# Patient Record
Sex: FEMALE | Race: WHITE | Employment: PART TIME | URBAN - METROPOLITAN AREA
[De-identification: names, ages, dates, MRNs, and addresses within clinical notes are randomized per-mention and may not be internally consistent; named-entity substitution may affect disease eponyms.]

---

## 2017-02-03 ENCOUNTER — OFFICE VISIT (OUTPATIENT)
Dept: FAMILY MEDICINE | Facility: CLINIC | Age: 52
End: 2017-02-03
Payer: COMMERCIAL

## 2017-02-03 ENCOUNTER — OFFICE VISIT (OUTPATIENT)
Dept: FAMILY MEDICINE | Facility: CLINIC | Age: 52
End: 2017-02-03
Payer: OTHER MISCELLANEOUS

## 2017-02-03 VITALS
TEMPERATURE: 97.8 F | HEIGHT: 63 IN | DIASTOLIC BLOOD PRESSURE: 56 MMHG | HEART RATE: 56 BPM | SYSTOLIC BLOOD PRESSURE: 115 MMHG | BODY MASS INDEX: 21.26 KG/M2 | WEIGHT: 120 LBS

## 2017-02-03 VITALS
SYSTOLIC BLOOD PRESSURE: 115 MMHG | TEMPERATURE: 97.8 F | DIASTOLIC BLOOD PRESSURE: 56 MMHG | HEIGHT: 63 IN | HEART RATE: 56 BPM | WEIGHT: 120.1 LBS | BODY MASS INDEX: 21.28 KG/M2

## 2017-02-03 DIAGNOSIS — R51.9 PERSISTENT HEADACHES: Primary | ICD-10-CM

## 2017-02-03 DIAGNOSIS — S06.0X0S CONCUSSION, WITHOUT LOSS OF CONSCIOUSNESS, SEQUELA: Primary | ICD-10-CM

## 2017-02-03 DIAGNOSIS — R51.9 CHRONIC DAILY HEADACHE: ICD-10-CM

## 2017-02-03 DIAGNOSIS — M26.609 TMJ (TEMPOROMANDIBULAR JOINT SYNDROME): ICD-10-CM

## 2017-02-03 PROCEDURE — 99213 OFFICE O/P EST LOW 20 MIN: CPT | Performed by: PHYSICIAN ASSISTANT

## 2017-02-03 NOTE — MR AVS SNAPSHOT
After Visit Summary   2/3/2017    Opal Dupree    MRN: [id number]           Patient Information     Date Of Birth          1965        Visit Information        Provider Department      2/3/2017 2:00 PM Aubree Coates PA-C [facility]        Today's Diagnoses     Persistent headaches    -  1     Chronic daily headache           Care Instructions    Follow up for physical  For headaches: neck stretches, heat/ice  - make appointment to see neuro  - MRI/MRA - For [place] imaging department: call [phone] to schedule  - we'll let you know about labs                               Tension Headaches  Tension headaches cause a dull, steady pain on both sides of the head and in the neck and the back of the head. The eyes may also feel tired. Tension headaches can be triggered by lack of sleep, poor posture, eyestrain, stress, and other factors.          To help prevent tension headaches:    Make sure your work area is properly set up to help you avoid neck strain and eyestrain.    Make sure that your eyeglass prescription is current and is appropriate for the work you do.    Learn techniques for relaxing and reducing emotional stress. These include deep breathing, progressive relaxation, and biofeedback.    Maintain a regular exercise regimen under the guidance of a doctor. This can help keep your neck and back flexible, strong, and relaxed.  To relieve the pain:    Use moist heat to relax the muscles. Soak in a hot bath or wrap a warm, moist towel around your neck.    Brush your scalp lightly with a soft hairbrush.    Give yourself a massage. Knead the muscles running from your shoulders up the back of your skull.    Use an ice pack. Apply this directly to the place where you feel pain.    Rest. Sleeping often helps relieve headache pain.    Drink plenty of fluids. Dehydration is another trigger for headaches.    Neck Tension Rehabilitation Exercises   You may do all of these exercises  right away but avoid any movements that increase your pain.     Neck rotation with flexion:   Right: Turn your head to the right and clasp your hands behind your head. Let the weight of your arms pull your chin to the right side of your chest. Relax. Hold for a count of 15. Do this 3 times.   Left: Turn your head to the left and clasp your hands behind your head. Let the weight of your arms pull your chin to the left side of your chest. Relax. Hold for a count of 15. Do this 3 times.     Chin tuck: Place your fingertips on your chin and gently push your head straight back as if you are trying to make a double chin. Keep looking forward as your head moves back. Hold 5 seconds and repeat 5 times.     Scalene stretch: This stretches the neck muscles that attach to your ribs. Sitting in an upright position, clasp both hands behind your back, lower your left shoulder, and tilt your head toward the right. Hold this position for 15 to 30 seconds and then come back to the starting position. Lower your right shoulder and tilt your head toward the left until you feel a stretch. Hold for 15 to 30 seconds. Repeat 3 times on each side.     Neck rotation stretch   Right side: Rotate your neck by looking over your right shoulder. Lift your right hand and place your palm on the left side of your chin. Push your chin with your palm toward your right shoulder. Hold for a count of 10. Do this 3 times.   Left side: Rotate your neck by looking over your left shoulder. Lift your left hand and place your palm on the right side of your chin. Push your chin with your palm toward your left shoulder. Hold for a count of 10. Do this 3 times.     Scapular squeeze: While sitting or standing with your arms by your sides, squeeze your shoulder blades together and hold for 5 seconds. Do 3 sets of 10.     Thoracic extension: While sitting in a chair, clasp both arms behind your head. Gently arch backward and look up toward the ceiling. Repeat 10  times. Do this several times per day.                               Follow-ups after your visit        Additional Services     NEUROLOGY ADULT REFERRAL       Your provider has referred you to: FMG: [place] [facility] [phone]   [email or web address]  FMG: [place] [facility] [phone]   [email or web address]  UMP: [facility] [phone]   [email or web address]  FHN: [facility] [phone]   [email or web address]    Reason for Referral: Consult    Please be aware that coverage of these services is subject to the terms and limitations of your health insurance plan.  Call member services at your health plan with any benefit or coverage questions.      Please bring the following with you to your appointment:    (1) Any X-Rays, CTs or MRIs which have been performed.  Contact the facility where they were done to arrange for  prior to your scheduled appointment.    (2) List of current medications  (3) This referral request   (4) Any documents/labs given to you for this referral                  Future tests that were ordered for you today     Open Future Orders        Priority Expected Expires Ordered    MR Brain w/o & w Contrast Routine  2/3/2018 2/3/2017    MRA Brain Venogram w&wo Contrast Routine  2/3/2018 2/3/2017            Who to contact     Normal or non-critical lab and imaging results will be communicated to you by MyChart, letter or phone within 4 business days after the clinic has received the results. If you do not hear from us within 7 days, please contact the clinic through [employer] or phone. If you have a critical or abnormal lab result, we will notify you by phone as soon as possible.  Submit refill requests through Ztory or call your pharmacy and they will forward the refill request to us. Please  "allow 3 business days for your refill to be completed.          If you need to speak with a  for additional information , please call: [phone]             Additional Information About Your Visit        MyChart Information     Trineanhart lets you send messages to your doctor, view your test results, renew your prescriptions, schedule appointments and more. To sign up, go to [email or web address] . Click on \"Log in\" on the left side of the screen, which will take you to the Welcome page. Then click on \"Sign up Now\" on the right side of the page.     You will be asked to enter the access code listed below, as well as some personal information. Please follow the directions to create your username and password.     Your access code is: [email or web address]  Expires: 3/19/2017  2:30 PM     Your access code will  in 90 days. If you need help or a new code, please call your [place] clinic or [phone].        Care EveryWhere ID     This is your Care EveryWhere ID. This could be used by other organizations to access your [place] medical records  [id number]        Your Vitals Were     Last Period                   2011            Blood Pressure from Last 3 Encounters:   17 115/56   16 117/71   16 112/74    Weight from Last 3 Encounters:   17 120 lb 1.6 oz (54.477 kg)   16 123 lb 8 oz (56.019 kg)   16 124 lb 9.6 oz (56.518 kg)              We Performed the Following     CBC with platelets differential     Comprehensive metabolic panel     NEUROLOGY ADULT REFERRAL     TSH with free T4 reflex        Primary Care Provider Office Phone # Fax #    KEITH Batista [phone]       [place] [facility] [address] [place] [address]        Thank you!     Thank you for choosing [facility]  for your care. Our goal is always to provide you with excellent care. Hearing back from our patients is one way we can continue " to improve our services. Please take a few minutes to complete the written survey that you may receive in the mail after your visit with us. Thank you!             Your Updated Medication List - Protect others around you: Learn how to safely use, store and throw away your medicines at www.disposemymeds.org.      Notice  As of 2/3/2017  3:30 PM    You have not been prescribed any medications.

## 2017-02-03 NOTE — NURSING NOTE
"Chief Complaint   Patient presents with     WC     Follow up from WC 12/19/2016     Headache     Still having headaches       Initial /56 mmHg  Pulse 56  Temp(Src) 97.8  F (36.6  C) (Tympanic)  Ht 5' 2.5\" (1.588 m)  Wt 120 lb 1.6 oz (54.477 kg)  BMI 21.60 kg/m2  LMP 01/14/2011 Estimated body mass index is 21.6 kg/(m^2) as calculated from the following:    Height as of this encounter: 5' 2.5\" (1.588 m).    Weight as of this encounter: 120 lb 1.6 oz (54.477 kg).  BP completed using cuff size: catrachito Valenzuela CMA  "

## 2017-02-03 NOTE — PATIENT INSTRUCTIONS
Follow up for physical  For headaches: neck stretches, heat/ice  - make appointment to see neuro  - MRI/MRA - For [place] imaging department: call [phone] to schedule  - we'll let you know about labs                               Tension Headaches  Tension headaches cause a dull, steady pain on both sides of the head and in the neck and the back of the head. The eyes may also feel tired. Tension headaches can be triggered by lack of sleep, poor posture, eyestrain, stress, and other factors.          To help prevent tension headaches:    Make sure your work area is properly set up to help you avoid neck strain and eyestrain.    Make sure that your eyeglass prescription is current and is appropriate for the work you do.    Learn techniques for relaxing and reducing emotional stress. These include deep breathing, progressive relaxation, and biofeedback.    Maintain a regular exercise regimen under the guidance of a doctor. This can help keep your neck and back flexible, strong, and relaxed.  To relieve the pain:    Use moist heat to relax the muscles. Soak in a hot bath or wrap a warm, moist towel around your neck.    Brush your scalp lightly with a soft hairbrush.    Give yourself a massage. Knead the muscles running from your shoulders up the back of your skull.    Use an ice pack. Apply this directly to the place where you feel pain.    Rest. Sleeping often helps relieve headache pain.    Drink plenty of fluids. Dehydration is another trigger for headaches.    Neck Tension Rehabilitation Exercises   You may do all of these exercises right away but avoid any movements that increase your pain.     Neck rotation with flexion:   Right: Turn your head to the right and clasp your hands behind your head. Let the weight of your arms pull your chin to the right side of your chest. Relax. Hold for a count of 15. Do this 3 times.   Left: Turn your head to the left and clasp your hands behind your head. Let the weight of  your arms pull your chin to the left side of your chest. Relax. Hold for a count of 15. Do this 3 times.     Chin tuck: Place your fingertips on your chin and gently push your head straight back as if you are trying to make a double chin. Keep looking forward as your head moves back. Hold 5 seconds and repeat 5 times.     Scalene stretch: This stretches the neck muscles that attach to your ribs. Sitting in an upright position, clasp both hands behind your back, lower your left shoulder, and tilt your head toward the right. Hold this position for 15 to 30 seconds and then come back to the starting position. Lower your right shoulder and tilt your head toward the left until you feel a stretch. Hold for 15 to 30 seconds. Repeat 3 times on each side.     Neck rotation stretch   Right side: Rotate your neck by looking over your right shoulder. Lift your right hand and place your palm on the left side of your chin. Push your chin with your palm toward your right shoulder. Hold for a count of 10. Do this 3 times.   Left side: Rotate your neck by looking over your left shoulder. Lift your left hand and place your palm on the right side of your chin. Push your chin with your palm toward your left shoulder. Hold for a count of 10. Do this 3 times.     Scapular squeeze: While sitting or standing with your arms by your sides, squeeze your shoulder blades together and hold for 5 seconds. Do 3 sets of 10.     Thoracic extension: While sitting in a chair, clasp both arms behind your head. Gently arch backward and look up toward the ceiling. Repeat 10 times. Do this several times per day.

## 2017-02-03 NOTE — MR AVS SNAPSHOT
"              After Visit Summary   2/3/2017    Opal Dupree    MRN: [id number]           Patient Information     Date Of Birth          1965        Visit Information        Provider Department      2/3/2017 2:20 PM Aubree Coates PA-C [facility]        Today's Diagnoses     Concussion, without loss of consciousness, sequela (H)    -  1        Follow-ups after your visit        Who to contact     Normal or non-critical lab and imaging results will be communicated to you by Clear[employer] Analyticshart, letter or phone within 4 business days after the clinic has received the results. If you do not hear from us within 7 days, please contact the clinic through [employer]hart or phone. If you have a critical or abnormal lab result, we will notify you by phone as soon as possible.  Submit refill requests through MediVision or call your pharmacy and they will forward the refill request to us. Please allow 3 business days for your refill to be completed.          If you need to speak with a  for additional information , please call: [phone]             Additional Information About Your Visit        [employer] Information     MediVision lets you send messages to your doctor, view your test results, renew your prescriptions, schedule appointments and more. To sign up, go to [email or web address] . Click on \"Log in\" on the left side of the screen, which will take you to the Welcome page. Then click on \"Sign up Now\" on the right side of the page.     You will be asked to enter the access code listed below, as well as some personal information. Please follow the directions to create your username and password.     Your access code is: [email or web address]  Expires: 3/19/2017  2:30 PM     Your access code will  in 90 days. If you need help or a new code, please call your [facility] or [phone].        Care EveryWhere ID     This is your Care EveryWhere ID. This could be used by other organizations to access your " "[place] medical records  [id number]        Your Vitals Were     Pulse Temperature Height BMI (Body Mass Index) Last Period       56 97.8  F (36.6  C) (Tympanic) 5' 2.5\" (1.588 m) 21.60 kg/m2 01/14/2011        Blood Pressure from Last 3 Encounters:   02/03/17 115/56   02/03/17 115/56   12/19/16 117/71    Weight from Last 3 Encounters:   02/03/17 120 lb 1.6 oz (54.477 kg)   02/03/17 120 lb (54.432 kg)   12/19/16 123 lb 8 oz (56.019 kg)              Today, you had the following     No orders found for display       Primary Care Provider Office Phone # Fax #    KEITH Batista [facility] [phone]       [place] [facility] [address]   [place] [address]        Thank you!     Thank you for choosing [facility]  for your care. Our goal is always to provide you with excellent care. Hearing back from our patients is one way we can continue to improve our services. Please take a few minutes to complete the written survey that you may receive in the mail after your visit with us. Thank you!             Your Updated Medication List - Protect others around you: Learn how to safely use, store and throw away your medicines at www.disposemymeds.org.      Notice  As of 2/3/2017 11:59 PM    You have not been prescribed any medications.      "

## 2017-02-03 NOTE — PROGRESS NOTES
"SUBJECTIVE:                                                    Opal Dupree is a 51 year old female who presents to clinic today for the following health issues:    *  Follow up on WC from 2016, still having light sensitivity and occasionally headache but will lay down and she feels better, has not had to take any pain relief. Occasional dizziness    Right Eye 20/25   Left Eye 20/25   Both Eyes       20/25     Remembers the first few days \"rough\" = sensitive to light and sound, didn't feel well. It was soon before [place] vacation so she didn't have to work a while  She went back in January, \"a little sensitive\" to gym lights and loud kids, and restarted gym time last week - this went okay, being back full time  At home she's fine. She doesn't do much screens anyway, TV doesn't bother    Cognitive:  Immediate object recall: 3/3  4 Object Recall at 5 minutes:3/3  Reverse months of the year:   Spell world backwards: Able  Backwards number strin numbers              4-9-3                  Alternate:      6-2-9              3-8-1-4                        3-2-7-9              6-2-9-7-1                                 1-5-2-8-6                       7-1-8-4-6-2                              5-3-9-1-4-8          Problem list and histories reviewed & adjusted, as indicated.  Additional history: none    ROS:  Other than noted above, general, HEENT, respiratory, cardiac and gastrointestinal systems are negative.     OBJECTIVE:                                                    /56 mmHg  Pulse 56  Temp(Src) 97.8  F (36.6  C) (Tympanic)  Ht 5' 2.5\" (1.588 m)  Wt 120 lb 1.6 oz (54.477 kg)  BMI 21.60 kg/m2  LMP 2011 Body mass index is 21.6 kg/(m^2).   GENERAL: healthy, alert, well nourished, well hydrated, no distress  EYES: Eyes grossly normal to inspection, extraocular movements - intact, and PERRL  HENT: ear canals- normal; TMs- normal; Nose- normal; Mouth- no ulcers, no lesions  NECK: no " tenderness, no adenopathy, no asymmetry, no masses, no stiffness; thyroid- normal to palpation  RESP: lungs clear to auscultation - no rales, no rhonchi, no wheezes  CV: regular rates and rhythm, normal S1 S2, no S3 or S4 and no murmur, no click or rub -  ABDOMEN: soft, no tenderness, no  hepatosplenomegaly, no masses, normal bowel sounds  MS: extremities- no gross deformities noted, no edema  Neck full ROM   SKIN: no suspicious lesions, no rashes  NEURO: Normal strength and tone, sensory exam grossly normal, mentation intact and speech normal. Reflexes equal and symmetric.  CN 2-12 grossly intact. Romberg negative. Heel/toe walk normal. Alternating movements, heel to shin normal, proprioception normal. PERRLA, EOMs intact.        ASSESSMENT/PLAN:                                                    ASSESSMENT/PLAN:      ICD-10-CM    1. Concussion, without loss of consciousness, sequela (H) S06.0X0S      Work comp. Concussion symptoms improved/resolved.     Aubree Coates PA-C   [facility]

## 2017-02-03 NOTE — LETTER
[facility]  [address] [address]  [phone]        February 8, 2018    Opal Dupree  [address] [place] [address]              Dear Opal Dupree    This is to remind you that your fasting lab is due.    You may call our office at [phone] to schedule an appointment.    Please disregard this notice if you have already had your labs drawn or made an appointment.        Sincerely,        Aubree Coates PA-C

## 2017-02-06 NOTE — PROGRESS NOTES
"SUBJECTIVE:                                                    Opal Dupree is a 51 year old female who presents to clinic today for the following health issues:    *  Follow up on WC from 12/19/2016, still having light sensitivity and occasionally headache but will lay down and she feels better, has not had to take any pain relief. Occasional dizziness    Right Eye 20/25   Left Eye 20/25   Both Eyes       20/25     Remembers the first few days \"rough\" = sensitive to light and sound, didn't feel well. It was soon before [place] vacation so she didn't have to work a while  She went back in January, \"a little sensitive\" to gym lights and loud kids, and restarted gym time last week - this went okay, being back full time  At home she's fine. She doesn't do much screens anyway, TV doesn't bother    \"always\" has a headache for 3 years now.  \"light pressure\" forehead, sometimes wraps around or on top of head. She has TMJ which can worsen headaches  She does not take OTC meds. She will take a nap at home  No vision changes, n/v,   Light/sound sensitivity improved  Wears mouth guard for tMJ. Has seen a specialist         Problem list and histories reviewed & adjusted, as indicated.  Additional history: none    ROS:  Other than noted above, general, HEENT, respiratory, cardiac and gastrointestinal systems are negative.     OBJECTIVE:                                                    /56 mmHg  Pulse 56  Temp(Src) 97.8  F (36.6  C) (Tympanic)  Ht 5' 2.5\" (1.588 m)  Wt 120 lb 1.6 oz (54.477 kg)  BMI 21.60 kg/m2  LMP 01/14/2011 Body mass index is 21.6 kg/(m^2).   GENERAL: healthy, alert, well nourished, well hydrated, no distress  EYES: Eyes grossly normal to inspection, extraocular movements - intact, and PERRL  HENT: ear canals- normal; TMs- normal; Nose- normal; Mouth- no ulcers, no lesions  NECK: no tenderness, no adenopathy, no asymmetry, no masses, no stiffness; thyroid- normal to palpation  RESP: lungs " clear to auscultation - no rales, no rhonchi, no wheezes  CV: regular rates and rhythm, normal S1 S2, no S3 or S4 and no murmur, no click or rub -  ABDOMEN: soft, no tenderness, no  hepatosplenomegaly, no masses, normal bowel sounds  MS: extremities- no gross deformities noted, no edema  Neck full ROM   SKIN: no suspicious lesions, no rashes  NEURO: Normal strength and tone, sensory exam grossly normal, mentation intact and speech normal. Reflexes equal and symmetric.  CN 2-12 grossly intact. Romberg negative. Heel/toe walk normal. Alternating movements, heel to shin normal, proprioception normal. PERRLA, EOMs intact.        ASSESSMENT/PLAN:                                                      ASSESSMENT/PLAN:      ICD-10-CM    1. Persistent headaches R51 NEUROLOGY ADULT REFERRAL     MR Brain w/o & w Contrast     MRA Brain Venogram w&wo Contrast     TSH with free T4 reflex     Comprehensive metabolic panel     CBC with platelets differential     CANCELED: TSH with free T4 reflex     CANCELED: Comprehensive metabolic panel     CANCELED: CBC with platelets differential   2. Chronic daily headache R51 NEUROLOGY ADULT REFERRAL     MR Brain w/o & w Contrast     MRA Brain Venogram w&wo Contrast     TSH with free T4 reflex     Comprehensive metabolic panel     CBC with platelets differential     CANCELED: TSH with free T4 reflex     CANCELED: Comprehensive metabolic panel     CANCELED: CBC with platelets differential   3. TMJ (temporomandibular joint syndrome) M26.609      Patient in for work comp concussion - separate encounter created for chronic headaches.  Sound tension type but recommended further workup to r/o pathology  Recommended come in for physical - she thinks she had a physical in [place] in last few years but needs to figure out where that was, will send records    Patient Instructions   Follow up for physical  For headaches: neck stretches, heat/ice  - make appointment to see neuro  - MRI/MRA - For [place]  imaging department: call [phone] to schedule  - we'll let you know about labs    Tension headache handout    Aubree Coates PA-C   [facility]

## 2017-02-14 ENCOUNTER — TELEPHONE (OUTPATIENT)
Dept: FAMILY MEDICINE | Facility: CLINIC | Age: 52
End: 2017-02-14

## 2017-02-14 NOTE — LETTER
[facility] [place] [id number] [facility]  [place] [address] [phone]      2017      Opal Dupree  [address]   [place] [facility] [address]        Dear Opal,     As part of [place]'s commitment to health and wellness we have recently reviewed your chart and your medical record indicates that you are due for one or more of the following:    -- Pap smear. The last pap that we have on file for you was from 05. These are recommended every 3 years. Please call our clinic to schedule your pap smear / physical appointment with fasting lab work. Please come to your appointment having had nothing to eat or drink for 8 to 10 hours EXCEPT water and medications.     -- Tetanus shot. The last tetanus vaccine that we have on file for you is 06. This vaccine is only good for 10 years which means yours  in 2016. The most recent tetanus vaccine is a combination shot which includes vaccinations for tetanus and whooping cough. Please call to schedule a nurse appointment to update this vaccine or, if it is more convenient for you, our pharmacy takes walk ins for this also.    -- Mammogram. Please call one of the following numbers to schedule:  [facility] [phone]  [facility] [phone]  [facility] [phone]  [facility] [phone]  [facility] [phone]  [facility] [phone]    -- Colonoscopy or FIT test. Please call one of the following numbers to schedule a colonoscopy:  [facility] [phone]  [facility] [phone]  [facility] [phone]  [facility] [phone] (multiple sites, call for locations)    A colonoscopy is the gold standard but if you are reluctant to do this there is a less invasive and less expensive alternative. FIT (fecal immunochemical testing) is a screening option that is performed on a yearly basis. This is a test to check for blood in the stool, which can be  performed in the comfort of your own home. If you are willing to perform this test, we can order the kit for you to  at our lab. When you have completed the test, you simply mail it in the pre-addressed envelope.    Please call us at [phone] if you need an order for a colonoscopy or FIT testing.    Please try to schedule and/or complete the tests above within the next 2-4 weeks.   The number to call to schedule an appointment at [facility] is [phone].    While we work hard to maintain accurate records on all our patients, it is always possible that this notice does not accurately reflect tests that you may have had. To ensure that we do not continue to send you notices please verify, at your next visit or by a [employer] message, that we have accurate dates of your tests, even if these were done many years ago.     Working together for your health is our goal.    Thank you for choosing [place] for your healthcare needs.      Sincerely,     PATTI Soto/ keke

## 2017-02-14 NOTE — TELEPHONE ENCOUNTER
Panel Management Review      Patient has the following on her problem list: None      Composite cancer screening  Chart review shows that this patient is due/due soon for the following Pap Smear, Mammogram and Colonoscopy  Summary:    Patient is due/failing the following:   PAP; MAMMOGRAM; COLONOSCOPY; LIPIDS; TETANUS    Action needed:   Patient needs office visit for physical with PAP and FASTING LABS; Orders for MAMMOGRAM and COLONOSCOPY; Updated TETANUS.    Type of outreach:    Sent letter.    Questions for provider review:    None                                                                                                                                    Jocy English CMA ([facility])       Chart routed to None .

## 2017-03-02 ENCOUNTER — TELEPHONE (OUTPATIENT)
Dept: FAMILY MEDICINE | Facility: CLINIC | Age: 52
End: 2017-03-02

## 2017-03-02 NOTE — TELEPHONE ENCOUNTER
Health Care Provider report received and completed by Aubree.  Faxed to [place] [employer] and original to scanning.  Copy placed in drawer..Mary Carmen Lyles

## 2017-07-29 ENCOUNTER — HEALTH MAINTENANCE LETTER (OUTPATIENT)
Age: 52
End: 2017-07-29

## 2018-03-13 ENCOUNTER — TELEPHONE (OUTPATIENT)
Dept: LAB | Facility: CLINIC | Age: 53
End: 2018-03-13

## 2018-07-23 ENCOUNTER — OFFICE VISIT (OUTPATIENT)
Dept: FAMILY MEDICINE | Facility: CLINIC | Age: 53
End: 2018-07-23
Payer: COMMERCIAL

## 2018-07-23 VITALS
HEIGHT: 63 IN | WEIGHT: 120 LBS | DIASTOLIC BLOOD PRESSURE: 68 MMHG | TEMPERATURE: 98.2 F | SYSTOLIC BLOOD PRESSURE: 104 MMHG | HEART RATE: 60 BPM | OXYGEN SATURATION: 98 % | BODY MASS INDEX: 21.26 KG/M2
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
## 2018-07-23 DIAGNOSIS — K21.9 GASTROESOPHAGEAL REFLUX DISEASE WITHOUT ESOPHAGITIS: ICD-10-CM

## 2018-07-23 PROCEDURE — 99214 OFFICE O/P EST MOD 30 MIN: CPT | Performed by: PHYSICIAN ASSISTANT

## 2018-07-23 PROCEDURE — 86618 LYME DISEASE ANTIBODY: CPT | Performed by: PHYSICIAN ASSISTANT

## 2018-07-23 PROCEDURE — 36415 COLL VENOUS BLD VENIPUNCTURE: CPT | Performed by: PHYSICIAN ASSISTANT

## 2018-07-23 ASSESSMENT — PAIN SCALES - GENERAL: PAINLEVEL: NO PAIN (0)

## 2018-07-23 NOTE — PATIENT INSTRUCTIONS
Tips to Control Acid Reflux    To control acid reflux, you ll need to make some basic diet and lifestyle changes. The simple steps outlined below may be all you ll need to ease discomfort.  Watch what you eat    Avoid fatty foods and spicy foods.    Eat fewer acidic foods, such as citrus and tomato-based foods. These can increase symptoms.    Limit drinking alcohol, caffeine, and fizzy beverages. All increase acid reflux.    Try limiting chocolate, peppermint, and spearmint. These can worsen acid reflux in some people.  Watch when you eat    Avoid lying down for 3 hours after eating.    Do not snack before going to bed.  Raise your head  Raising your head and upper body by 4 to 6 inches helps limit reflux when you re lying down. Put blocks under the head of your bed frame to raise it.  Other changes    Lose weight, if you need to    Don t exercise near bedtime    Avoid tight-fitting clothes    Limit aspirin and ibuprofen    Stop smoking   Date Last Reviewed: 7/1/2016 2000-2017 [employer]. [address] [place] [address], [place] [address]. All rights reserved. This information is not intended as a substitute for professional medical care. Always follow your healthcare professional's instructions.

## 2018-07-23 NOTE — LETTER
July 25, 2018      Opal Dupree  [address] [place] [address]      Dear Opal,     Your lymes screen was negative.  No further testing or treatment is necessary at this time.   Please follow-up if you have any questions or concerns.     Sincerely,     Carmen Parish PA-C    Resulted Orders   Lyme Disease Blank with reflex to WB Serum   Result Value Ref Range    Lyme Disease Antibodies Serum 0.06 0.00 - 0.89      Comment:      Negative, Absence of detectable Borrelia burdorferi antibodies. A negative   result does not exclude the possibility of Borrelia burgdorferi infection. If   early Lyme disease is suspected, a second sample should be collected and   tested 2 to 4 weeks later.

## 2018-07-23 NOTE — MR AVS SNAPSHOT
After Visit Summary   7/23/2018    Opal Dupree    MRN: [id number]           Patient Information     Date Of Birth          1965        Visit Information        Provider Department      7/23/2018 2:40 PM Carmen Parish PA-C [facility]        Today's Diagnoses     Bug bite, initial encounter    -  1    Gastroesophageal reflux disease without esophagitis          Care Instructions      Tips to Control Acid Reflux    To control acid reflux, you ll need to make some basic diet and lifestyle changes. The simple steps outlined below may be all you ll need to ease discomfort.  Watch what you eat    Avoid fatty foods and spicy foods.    Eat fewer acidic foods, such as citrus and tomato-based foods. These can increase symptoms.    Limit drinking alcohol, caffeine, and fizzy beverages. All increase acid reflux.    Try limiting chocolate, peppermint, and spearmint. These can worsen acid reflux in some people.  Watch when you eat    Avoid lying down for 3 hours after eating.    Do not snack before going to bed.  Raise your head  Raising your head and upper body by 4 to 6 inches helps limit reflux when you re lying down. Put blocks under the head of your bed frame to raise it.  Other changes    Lose weight, if you need to    Don t exercise near bedtime    Avoid tight-fitting clothes    Limit aspirin and ibuprofen    Stop smoking   Date Last Reviewed: 7/1/2016 2000-2017 [employer]. [address] [place] [address], [place] [address]. All rights reserved. This information is not intended as a substitute for professional medical care. Always follow your healthcare professional's instructions.                Follow-ups after your visit        Who to contact     Normal or non-critical lab and imaging results will be communicated to you by MyChart, letter or phone within 4 business days after the clinic has received the results. If you do not hear from us within 7 days, please contact  "the clinic through [employer] or phone. If you have a critical or abnormal lab result, we will notify you by phone as soon as possible.  Submit refill requests through [employer] or call your pharmacy and they will forward the refill request to us. Please allow 3 business days for your refill to be completed.          If you need to speak with a  for additional information , please call: [phone]           Additional Information About Your Visit        Care EveryWhere ID     This is your Care EveryWhere ID. This could be used by other organizations to access your [facility] medical records  [id number]        Your Vitals Were     Pulse Temperature Height Last Period Pulse Oximetry BMI (Body Mass Index)    60 98.2  F (36.8  C) (Tympanic) 5' 2.5\" (1.588 m) 01/14/2011 98% 21.6 kg/m2       Blood Pressure from Last 3 Encounters:   07/23/18 104/68   02/03/17 115/56   02/03/17 115/56    Weight from Last 3 Encounters:   07/23/18 120 lb (54.4 kg)   02/03/17 120 lb 1.6 oz (54.5 kg)   02/03/17 120 lb (54.4 kg)              We Performed the Following     Lyme Confirm IgG by Immunoblot        Primary Care Provider Office Phone # Fax #    KEITH Batista [facility] [phone] [address] [place] [address]        Equal Access to Services     [facility]: Hadii aad ku hadasho Soflexali, waaxda luqadaha, qaybta kaalmada aderamiroyada, myesha maxwell . So [facility] [phone].    ATENCIÓN: Si habla español, tiene a vieyra disposición servicios gratuitos de asistencia lingüística. Llame al [phone].    We comply with applicable federal civil rights laws and [place] laws. We do not discriminate on the basis of race, color, national origin, age, disability, sex, sexual orientation, or gender identity.            Thank you!     Thank you for choosing [facility] [place]  for your care. Our goal is always to provide you with excellent care. Hearing back from our " patients is one way we can continue to improve our services. Please take a few minutes to complete the written survey that you may receive in the mail after your visit with us. Thank you!             Your Updated Medication List - Protect others around you: Learn how to safely use, store and throw away your medicines at www.disposemymeds.org.      Notice  As of 7/23/2018  3:25 PM    You have not been prescribed any medications.

## 2018-07-23 NOTE — PROGRESS NOTES
"  SUBJECTIVE:   Opal Dupree is a 52 year old female who presents to clinic today for the following health issues:    Bug bite of Left Elbow      Duration: 1 day    Description  Location: left elbow; felt something irritating her skin and \"swiped it off without seeing what it was\" Felt like a kind of insent  Itching: no    Intensity:  mild    Accompanying signs and symptoms: Swelling/ redness- about size of quarter in diameter    History (similar episodes/previous evaluation): None    Precipitating or alleviating factors:  New exposures:  None and trees  Recent travel: no      Therapies tried and outcome: Hydrogen peroxide to cleanse area of bite.    Rachel Tancharismanikki ZAMORA    No tick noted on skin.  She felt a bite to the elbow last night and scratched the area.  Did not see what type of bug it was.  She is worried she has lymes disease because a family member had it and was very painful.     More swollen this am, this has improved as the day has gone on .       Chest Pain      Onset: 1 day    Description (location/character/radiation/duration): Sternal/epigastric area    Intensity:  Severe- occurred when laying down to go to bed    Accompanying signs and symptoms:        Shortness of breath: YES       Sweating: no        Nausea/vomitting: no        Palpitations: no         Other (fevers/chills/cough/heartburn/lightheadedness): no- Some 'heaviness of chest today'    History (similar episodes/previous evaluation): None    Precipitating or alleviating factors:       Worse with exertion: no        Worse with breathing: no        Related to eating: no        Better with burping: no     Therapies tried and outcome: None    Rachel Galeas CMA    Felt a severe sharp pain last night before bed that lasted 30 minutes.  This morning she woke up and feels a heaviness in her chest.  At times she has to take a deep breath.  No arm pain.   She notices the pain after drinking a bloody Dorie and Wasabi.      No excessive anxiety or worry. " "    She is training for a stair climbing event, no chest pain or shortness of breath with her training.     She smoked tobacco for about 15 years (last cigarette was 20+ years ago)      Problem list and histories reviewed & adjusted, as indicated.  Additional history: as documented        Reviewed and updated as needed this visit by clinical staff       Reviewed and updated as needed this visit by Provider         ROS:  Constitutional, HEENT, cardiovascular, pulmonary, gi and gu systems are negative, except as otherwise noted.    OBJECTIVE:     /68  Pulse 60  Temp 98.2  F (36.8  C) (Tympanic)  Ht 5' 2.5\" (1.588 m)  Wt 120 lb (54.4 kg)  LMP 01/14/2011  SpO2 98%  BMI 21.6 kg/m2  Body mass index is 21.6 kg/(m^2).  GENERAL: healthy, alert and no distress  ABDOMEN: soft, nontender, no hepatosplenomegaly, no masses and bowel sounds normal  Skin:  Left elbow with a small red bug bite (very small, almost unable to visualize at this point without her showing me where it is).  No surrounding erythema, no swelling.      Diagnostic Test Results:  none     ASSESSMENT/PLAN:         1. Bug bite, initial encounter  I discussed with Opal that I am not concerned about a tick bite at this time and that a tick needs to be attached for approximately 4 hours and engorged to be more likely to transmit lymes.  She is still quite concerned, therefore I offered to check blood levels, if negative, no further testing necessary.    - Lyme Disease Blank with reflex to WB Serum    2. Gastroesophageal reflux disease without esophagitis  I reassured Opal that her pain was not consistent with a cardiac etiology.  She does not have any significant cardiac risk factors and is very active without symptoms.      Medication ordered, see .   I discussed common side effects of medication prescribed.    Limit Bloody Dorie's.     I have advised patient to eat smaller more frequent meals, avoid recumbency for 2-3 hours after eating, " and avoid spicy/greasy foods.  Patient education materials given today (GERD).   Opal Dupree is to f/u if she develops any blood or black stools or worsening abdominal pain.     Patient was instructed to f/u if symptoms worsen or fail to improve as anticipated.              FUTURE APPOINTMENTS:       - Follow-up visit if symptoms worsen or fail to improve as anticipated.     Carmen Parish PA-C  [facility]

## 2018-07-24 LAB — B BURGDOR IGG+IGM SER QL: 0.06 (ref 0–0.89)
